# Patient Record
Sex: FEMALE | Race: WHITE | Employment: UNEMPLOYED | URBAN - METROPOLITAN AREA
[De-identification: names, ages, dates, MRNs, and addresses within clinical notes are randomized per-mention and may not be internally consistent; named-entity substitution may affect disease eponyms.]

---

## 2017-05-18 ENCOUNTER — OFFICE VISIT (OUTPATIENT)
Dept: FAMILY MEDICINE CLINIC | Age: 27
End: 2017-05-18

## 2017-05-18 VITALS
OXYGEN SATURATION: 95 % | RESPIRATION RATE: 16 BRPM | SYSTOLIC BLOOD PRESSURE: 142 MMHG | DIASTOLIC BLOOD PRESSURE: 90 MMHG | BODY MASS INDEX: 28.12 KG/M2 | HEIGHT: 66 IN | HEART RATE: 68 BPM | WEIGHT: 175 LBS | TEMPERATURE: 98 F

## 2017-05-18 DIAGNOSIS — S69.92XA HAND INJURY, LEFT, INITIAL ENCOUNTER: ICD-10-CM

## 2017-05-18 DIAGNOSIS — S69.92XA WRIST INJURY, LEFT, INITIAL ENCOUNTER: Primary | ICD-10-CM

## 2017-05-18 NOTE — MR AVS SNAPSHOT
Visit Information Date & Time Provider Department Dept. Phone Encounter #  
 5/18/2017  3:15 PM Radha Roman, Sheri \[facility]\"" [address] [phone] [id number] Follow-up Instructions Return if symptoms worsen or fail to improve. Upcoming Health Maintenance Date Due  
 HPV AGE 9Y-34Y (1 of 3 - Female 3 Dose Series) 4/8/2003 DTaP/Tdap/Td series (1 - Tdap) 4/8/2013 PAP AKA CERVICAL CYTOLOGY 4/8/2013 INFLUENZA AGE 9 TO ADULT 8/1/2017 Allergies as of 5/18/2017  Review Complete On: 6/5/2013 By: Lester Puga Severity Noted Reaction Type Reactions Sulfa (Sulfonamide Antibiotics)  05/18/2017    Unknown (comments) Was told as a child Current Immunizations  Never Reviewed No immunizations on file. Not reviewed this visit You Were Diagnosed With   
  
 Codes Comments Wrist injury, left, initial encounter    -  Primary ICD-10-CM: K15.33CS ICD-9-CM: 959.3 Hand injury, left, initial encounter     ICD-10-CM: S12.66EE ICD-9-CM: 759. 4 Vitals BP Pulse Temp Resp Height(growth percentile) Weight(growth percentile) 142/90 68 98 °F (36.7 °C) (Oral) 16 5' 6\" (1.676 m) 175 lb (79.4 kg) LMP SpO2 BMI OB Status Smoking Status 05/02/2017 (Approximate) 95% 28.25 kg/m2 Having regular periods Never Smoker Vitals History BMI and BSA Data Body Mass Index Body Surface Area  
 28.25 kg/m 2 1.92 m 2 Preferred Pharmacy Pharmacy Name Phone [id number], [address] [place] [address] [phone] Your Updated Medication List  
  
   
This list is accurate as of: 5/18/17  3:44 PM.  Always use your most recent med list.  
  
  
  
  
 norgestimate-ethinyl estradiol 0.18/0.215/0.25 mg-25 mcg Tab Commonly known as:  ORTHO TRI-CYCLEN LO Take 1 Tab by mouth daily. We Performed the Following AMB SUPPLY ORDER [[id number] Custom] Comments: , wrist splint with thumb Follow-up Instructions Return if symptoms worsen or fail to improve. To-Do List   
 05/19/2017 Imaging:  XR HAND LT MIN 3 V   
  
 05/19/2017 Imaging:  XR WRIST LT AP/LAT/OBL MIN 3V Patient Instructions Go to [facility] radiology department in the next day or two to get the xrays that I've ordered. They will send me the report. In the meantime, wear the splint all times except when bathing. Recheck Monday. Introducing [facility]! [employer] introduces [employer] patient portal. Now you can access parts of your medical record, email your doctor's office, and request medication refills online. 1. In your internet browser, go to [email or web address] 2. Click on the First Time User? Click Here link in the Sign In box. You will see the New Member Sign Up page. 3. Enter your [employer] Access Code exactly as it appears below. You will not need to use this code after youve completed the sign-up process. If you do not sign up before the expiration date, you must request a new code. · [employer] Access Code: [email or web address] Expires: 8/16/2017  3:44 PM 
 
4. Enter the last four digits of your Social Security Number (xxxx) and Date of Birth (mm/dd/yyyy) as indicated and click Submit. You will be taken to the next sign-up page. 5. Create a [employer] ID. This will be your [employer] login ID and cannot be changed, so think of one that is secure and easy to remember. 6. Create a [employer] password. You can change your password at any time. 7. Enter your Password Reset Question and Answer. This can be used at a later time if you forget your password. 8. Enter your e-mail address. You will receive e-mail notification when new information is available in [address] 9. Click Sign Up. You can now view and download portions of your medical record.  
10. Click the Download Summary menu link to download a portable copy of your medical information. If you have questions, please visit the Frequently Asked Questions section of the [employer] website. Remember, [employer] is NOT to be used for urgent needs. For medical emergencies, dial 911. Now available from your iPhone and Android! Please provide this summary of care documentation to your next provider. If you have any questions after today's visit, please call [phone].

## 2017-05-18 NOTE — PATIENT INSTRUCTIONS
Go to [facility] radiology department in the next day or two to get the xrays that I've ordered. They will send me the report. In the meantime, wear the splint all times except when bathing. Recheck Monday.

## 2017-05-18 NOTE — PROGRESS NOTES
HISTORY OF PRESENT ILLNESS  David Ivey is a 24 y.o. female. HPI Comments: Ms. Shoshana Cesar, a 25 y/o  female, comes to the office for assessment of wrist pain. She suffered a [address] injury 2 days ago while running and now complains of L hand/wrist pain. Wrist Swelling          Review of Systems   Constitutional: Negative for chills and fever. HENT: Negative for sore throat. Eyes: Negative for blurred vision and double vision. Respiratory: Negative for cough and shortness of breath. Cardiovascular: Negative for chest pain and palpitations. Gastrointestinal: Negative for abdominal pain, nausea and vomiting. Musculoskeletal: Positive for falls and joint pain (wrist and hand). Neurological: Negative for dizziness and headaches. Psychiatric/Behavioral: Negative for depression. Physical Exam   Constitutional: Vital signs are normal. She appears well-developed and well-nourished. HENT:   Right Ear: Tympanic membrane and ear canal normal.   Left Ear: Tympanic membrane and ear canal normal.   Nose: Nose normal.   Mouth/Throat: Uvula is midline, oropharynx is clear and moist and mucous membranes are normal.   Eyes: Pupils are equal, round, and reactive to light. Neck: No thyromegaly present. Cardiovascular: Normal rate and regular rhythm. Pulmonary/Chest: Effort normal and breath sounds normal. No respiratory distress. She has no wheezes. Musculoskeletal:   L wrist is not swollen, radius is non-tender, full ROM. The dorsum of her L hand is swollen and she is tender over 3rd metacarpal. She does have mild snuffbox tenderness. Lymphadenopathy:     She has no cervical adenopathy. Skin: No rash noted. Nursing note and vitals reviewed. ASSESSMENT and PLAN    ICD-10-CM ICD-9-CM    1. Wrist injury, left, initial encounter S69. 92XA 959.3 XR WRIST LT AP/LAT/OBL MIN 3V      AMB SUPPLY ORDER   2. Hand injury, left, initial encounter S69. 92XA 959.4 XR HAND LT MIN 3 V       Will get xrays, immobilize the wrist and thumb in the meantime, and recheck on Monday.

## 2017-05-19 ENCOUNTER — HOSPITAL ENCOUNTER (OUTPATIENT)
Dept: GENERAL RADIOLOGY | Age: 27
Discharge: HOME | End: 2017-05-19
Payer: COMMERCIAL

## 2017-05-19 DIAGNOSIS — S69.92XA HAND INJURY, LEFT, INITIAL ENCOUNTER: ICD-10-CM

## 2017-05-19 DIAGNOSIS — S69.92XA WRIST INJURY, LEFT, INITIAL ENCOUNTER: ICD-10-CM

## 2017-05-19 PROCEDURE — 73130 X-RAY EXAM OF HAND: CPT

## 2017-05-19 PROCEDURE — 73110 X-RAY EXAM OF WRIST: CPT

## 2017-05-23 ENCOUNTER — TELEPHONE (OUTPATIENT)
Dept: FAMILY MEDICINE CLINIC | Age: 27
End: 2017-05-23

## 2017-05-23 DIAGNOSIS — S62.125A CLOSED NONDISPLACED FRACTURE OF LUNATE OF LEFT WRIST, INITIAL ENCOUNTER: Primary | ICD-10-CM

## 2017-05-23 NOTE — PROGRESS NOTES
Called patient to inform her Dr. Yakelin Crawford reviewed her x ray of the hand and it indicated a small chip fracture. Patient was advised Dr. Yakelin Crawford needs to refer her to ortho for further evaluation. Patient verbalized understanding and had no further questions.

## 2017-05-23 NOTE — TELEPHONE ENCOUNTER
----- Message from Augusta Lott LPN sent at 6/86/1741  9:34 AM EDT -----  Called patient to inform her Dr. Fish Morrow reviewed her x ray of the hand and it indicated a small chip fracture. Patient was advised Dr. Fish Morrow needs to refer her to ortho for further evaluation. Patient verbalized understanding and had no further questions.